# Patient Record
(demographics unavailable — no encounter records)

---

## 2018-03-17 NOTE — EKG
Test Reason : 

Blood Pressure : ***/*** mmHG

Vent. Rate : 077 BPM     Atrial Rate : 077 BPM

   P-R Int : 148 ms          QRS Dur : 076 ms

    QT Int : 408 ms       P-R-T Axes : 065 046 055 degrees

   QTc Int : 461 ms

 

Normal sinus rhythm

Possible Left atrial enlargement

Borderline ECG

 

Confirmed by LISBET VICENTE, MYRA JENKINS ([id number]),  CHEYANNE ANDREA ([id number]) on 3/17/2018 11:44:04 AM

 

Referred By:             Confirmed By:MYRA FRAUSTO MD

## 2018-06-04 NOTE — RAD
TWO VIEWS LEFT HIP:

 

HISTORY: 

Left hip pain.  A cow knocked me down on Saturday with left hip pain.

 

FINDINGS: 

AP and frogleg views of left hip are obtained on 6/4/18.  Comparison is made to previous exam from 1/
11/12.

 

Two views left hip demonstrate no evidence of left hip fractures, subluxations, or bony lesions.

 

IMPRESSION: 

Normal 2 views left hip.

 

POS: [facility]

## 2018-06-06 NOTE — CT
CT OF THE LEFT HIP

6/6/18

 

As the patient has a metallic hip arthroplasty in place, there is a large amount of artifact that is 
present which significantly degrades the images, particularly around the areas of interest. Axial sli
eric were acquired, and coronal and sagittal reconstructions were done. 

 

While there is no major fracture, on coronal image #16 there is an equivocal line at the junction of 
the left superior pubic ramus with the acetabulum. A hairline fracture here is possible.  I cannot co
nfirm it on all views due to the artifact and osteoporosis. Elsewhere, large gross fractures were not
 seen. There does not appear to be any excessive space around the stem of the prosthesis. 

 

IMPRESSION:  

Equivocal line at the junction of the left superior pubic ramus and acetabulum. I cannot exclude a sm
all hairline fracture here. 

 

 

 

POS: HOME

## 2018-06-06 NOTE — HP
DATE OF ADMISSION:  06/06/2018

 

CHIEF COMPLAINT:  Pain with inability to ambulate.

 

HISTORY OF PRESENT ILLNESS:  An 83-year-old female presented to the [facility] Emergency Department via EMS earlier today with complaints of pain and 
inability to perform weightbearing activity. The patient states she was bumped 
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 She has had prior 
hip replacements. She was evaluated at [facility] 
Emergency Department on 06/04/2018 with a left hip x-ray showing normal two 
views.  She was subsequently taken home by her cousin who lives locally as 
well. The patient typically lives alone and is able to perform her home 
activities of daily living.  She does have gait instability and normally uses a 
cane indoors and a walker outdoors. She presented today with complaints of 
decreased ability to perform weightbearing activities along with increased  
localized pain to her left hip. Subsequent imaging revealed suspicion for left 
superior ramus fracture.  She is unable to return home at this time secondary 
to her inability to ambulate and pain and thus she will be admitted for PT/OT, 
and pain control. While in the emergency department, she was mildly hypoxic, 
which was felt to be possibly secondary to receiving morphine. She has no 
baseline respiratory problems and is not on supplemental oxygen as an 
outpatient. She reports intake to be at baseline, but does have some issues 
with constipation for which she takes MiraLax at home on a p.r.n. basis. She is 
comfortable at this time and well aware of her reason for admission.

 

PAST MEDICAL HISTORY:  Gastroesophageal reflux disease, hypertension, uterine 
cancer, osteoarthritis, hypertension.

 

PAST SURGICAL HISTORY:  Abdominal hysterectomy, and bilateral hip replacement.

 

SOCIAL HISTORY:  Patient lives alone.  She has a cousin who lives locally and 
her sister lives at [facility].  She is a nonsmoker with no 
ETOH or illicit drug use.

 

ALLERGIES:  No known drug allergies.

 

FAMILY HISTORY:  Noncontributory.

 

CURRENT MEDICATIONS:  Include aspirin 81 mg p.o. daily, bisoprolol/
hydrochlorothiazide 5/6.25 mg p.o. daily, amlodipine 5 mg p.o. daily.

 

REVIEW OF SYSTEMS:  General:  Denies fever, chills or diaphoresis.  Ear, Nose, 
and Throat:  Denies sore throat, nasal drainage or congestion.  Cardiovascular:
  Denies chest pain, palpitations.  Respiratory:  Denies shortness of breath or 
cough.  Gastrointestinal:  Denies abdominal pain, nausea, vomiting or diarrhea.
  Does complain of some constipation.  Genitourinary:  Denies dysuria or 
hematuria.  Musculoskeletal:  Complains of left hip pain.  Dermatologic:  
Denies rash.  Neurologic:  Denies headache.

 

LABORATORY DATA:  CBC shows white blood cell count 7.0, H&H is 11.0 and 32.2, 
platelets are 204.  CMP shows normal electrolytes, BUN of 23, creatinine is 0.82
, and a GFR 67, glucose is 112.  Lactic acid is 1.0.  LFTs are normal.

 

PHYSICAL EXAMINATION:

VITAL SIGNS:  Temperature is 98.0, pulse is 77, respiratory rate is 20, oxygen 
93% on 2 liters, blood pressures 174/77.

GENERAL:  The patient is alert and oriented.  She is in no acute distress.

FACE:  No asymmetry.

EYES:  Conjunctivae are clear.  Extraocular muscles are intact bilaterally.  No 
discharge.

HEENT:  Within normal limits.  Oral cavity, moist mucous membranes.  She is 
missing most of her teeth.

NECK:  Supple, full range of motion.  No meningeal signs.

CARDIOVASCULAR:  Regular rate and rhythm, normal S1, S2.  No murmurs, rubs or 
gallops.

RESPIRATORY:  Nasal cannula is in place.  Clear to auscultation bilaterally 
without wheezes, rales or rhonchi.

ABDOMEN:  Soft, nontender to palpation, no masses.

EXTREMITIES:  Leg length discrepancy, right leg is shorter than the left.  She 
has osteoarthritic changes to bilateral hands.  She has discolored thickened 
toenails.  She has a left peripheral IV access.

SKIN:  Ecchymosis posterior lateral left knee.

NEUROLOGIC:  Nonfocal.  Cranial nerves II-XII are grossly intact.

MUSCULOSKELETAL:  There is tenderness to palpation into the left buttock.

 

ASSESSMENT AND PLAN:

1.  Left superior ramus fracture: nonsurgical diagnosis, the patient is unable 
to bear weight successfully at this time.  Thus, we will proceed with PT/OT and 
pain control.  I have discussed with the patient her options including 
potential placement to a long-term care facility.  She is undecided in regards 
to this at this time and would like to see how she progresses physically going 
forward; would like a  consult regarding this.

2.  Gait instability.  The patient typically ambulates with a cane or her 
walker.  She will work with therapy to try to regain her ability to ambulate 
successfully.

3.  Hypertension.  Patient's blood pressure is currently elevated.  We will 
continue her home blood pressure medications and monitor this to see if these 
need to be increased.  She is hemodynamically stable otherwise.

4.  Constipation, have ordered daily MiraLax and Milk of Magnesia p.r.n.

5.  Hypoxia.  The patient is currently on supplemental oxygen, which may be 
related to receiving narcotics.  We will work to wean the pt back to room air.

6.  Prophylaxis.  We will provide the patient famotidine for gastrointestinal 
prophylaxis but withhold anticoagulants secondary to her fall risk at this 
time. She is on aspirin.

 

CODE STATUS:  Patient is a FULL CODE.

 

MTDD

## 2018-06-06 NOTE — RAD
PELVIS ONE VIEW

6/6/18

 

AP views are provided. No definite fracture was appreciated, through the patient is osteopenia could 
easily mask subtle injuries. The pubic rings appeared basically intact. Bilateral hip arthroplasties 
are present. The SI joints showed no acute change. The symphysis shows no widening or offset. Severe 
degenerative change is seen in the lower lumbar spine. 

 

IMPRESSION:  

No acute findings. See CT report to follow.

 

POS: HOME

## 2018-06-06 NOTE — RAD
PORTABLE CHEST:

6/6/18

 

Comparison is made with a 12/10/14 study.

 

The heart is normal in size. The lungs are clear with no infiltrate, effusion, or congestion. A calci
fied granuloma is seen in the right mid lung zone and has not changed in size or appearance in the in
terval. The mediastinum appears normal and the trachea is midline. 

 

IMPRESSION:  

No acute thoracic finding. 

 

POS: HOME

## 2018-06-09 NOTE — DIS
DATE OF ADMISSION:  06/06/2018

 

DATE OF DISCHARGE:  06/09/2018

 

ADMISSION DIAGNOSES:

1.  Left superior ramus fracture.

2.  Gait instability.

3.  Hypertension.

4.  Constipation.

5.  Hypoxia.

 

DISCHARGE DIAGNOSES:

1.  Left superior ramus fracture.

2.  Gait instability.

3.  Hypertension.

4.  Constipation.

5.  Hypoxia, resolved.

 

ATTENDING PHYSICIAN:  Dr. Sanford Brar with Dr. Priyanka Ruelas covering on the date of discharge.

 

PROCEDURES:

1.  Lower extremity CT scan from the date of admission showing equivocal line at the junction of the 
left superior pubic ramus and acetabulum and cannot exclude a small hairline fracture.

2.  Pelvis x-ray from the date of admission showing no acute findings.

3.  Chest x-ray from the date of admission showing no acute thoracic findings.

 

HISTORY AND PHYSICAL:  Please see dictated report from the date of admission.

 

HOSPITAL COURSE:  Ms. Stewart is an 83-year-old  female with history of hypertension, gastroe
sophageal reflux disease and osteoarthritis, who was evaluated through the ER for complaint of persis
tent left hip pain status post recent fall.  A CT scan confirmed a suspected hairline fracture at the
 left superior pubic ramus.  Due to the fact that she has been unable to ambulate, she was admitted f
or pain control, PT and OT.  Her hospital course has been complicated by intermittent constipation, w
hich resolved with p.r.n. Milk of Magnesia.  She is also noted to have some hypoxia on admission, but
 was able to wean to room air by the date of her discharge.  She had negative chest imaging.

 

She has a history of hypertension and her systolic blood pressures range from the 120s to the 150s wh
ile hospitalized, deemed slightly elevated likely due to patient pain.  She was continued on her curr
ent regimen and her medications will continue to be adjusted accordingly and blood pressure monitored
 in her swing bed unit.

 

DISPOSITION:  Transfer to skilled nursing at [facility] for continued PT, OT and pain contro
l.

 

CODE STATUS:  FULL RESUSCITATION.

 

CONDITION:  Good.

 

MEDICATIONS:

1.  Acetaminophen 1000 mg p.o. q.6 hours p.r.n.

2.  Amlodipine 5 mg p.o. q. day.

3.  Aspirin 81 mg p.o. q. day.

4.  Bisoprolol 2.5 mg p.o. q. day.

5.  Calcium carbonate 500 mg p.o. q. day.

6.  Famotidine 20 mg p.o. b.i.d.

7.  Hydrochlorothiazide 6.25 mg p.o. daily.

8.  Hydrocodone 5/325 one p.o. q.4 hours p.r.n. mild to moderate pain.

9.  Hydrocodone 10/325 one p.o. q.4 hours p.r.n. severe pain.

10.  Claritin 10 mg p.o. q. day.

11.  Milk of Magnesia 30 mL p.o. q. day p.r.n. constipation.

12.  Zofran 4 mg sublingual q.6 hours p.r.n. nausea and vomiting.

13.  Polyethylene glycol 17 grams p.o. q. day.

14.  Zocor 10 mg p.o. q.p.m.

 

FOLLOWUP:  Follow up per swing bed disposition.

## 2018-06-16 NOTE — DIS
DATE OF ADMISSION:  06/06/2018

 

DATE OF DISCHARGE:  06/15/2018

 

ADMISSION DIAGNOSES:  Left superior ramus fracture, gait instability, hypoxia.

 

SECONDARY DIAGNOSES:  Hypertension and constipation.

 

PROCEDURE:  The x-ray of the left hip showed suspicion for a left superior 
ramus fracture.

 

HOSPITAL COURSE:  An 83-year-old female initially presented to [facility] 
Emergency Department with increasing pain to the left hip with inability to 
perform weightbearing activity.  Prior to evaluation here, she had been bumped 
by one of her cows, falling on her left side, which prompted her to seek 
evaluation at [facility] where a left hip x-ray 
showed a normal 2-view.  She was taken home by her cousin, who lives locally, 
but had gradually worsening pain and gait instability; she typically uses a 
cane or walker; however, was unable to effectively ambulate.  Re-evaluation of 
her imaging showed suspicion for the left superior ramus fracture.  She 
received pain medication in the emergency department and was notably hypoxic as 
well which was felt to be secondary to opioid treatment.  



She was admitted for pain control, hypoxia and inability to ambulate.  While 
here, she participated with PT and OT and improved her ability to ambulate with 
the assistance of her walker.  She was successfully weaned off of supplemental 
oxygen.  The patient lives alone, and although she has improved enough to 
potentially be able to return to her home setting with her improvement with PT/
OT, on discussion with her cousin, who lives locally, has revealed that the 
patient has an unsafe living situation including lack of running water, no air 
conditioning, and holes in her floor.  Thus, the patient has agreed to 
transition to [facility] for further care at this time.  
The patient is amenable to this plan of care.  As an aside, she did have 
constipation during her stay, which was effectively treated.

 

DISPOSITION:  The patient was discharged to [facility] 
for further care.

 

DISCHARGE MEDICATIONS:  She will resume her usual medications including aspirin 
81 mg p.o. daily, bisoprolol/hydrochlorothiazide 5/6.25 mg p.o. daily, 
amlodipine 5 mg p.o. daily.  She will be provided tramadol 50 mg q.8 hours 
p.r.n. for further pain control.

 

[facility] Center[facility]IAH

## 2019-10-31 NOTE — CT
CT OF THE BRAIN WITHOUT CONTRAST:

10/31/19

 

Diffuse atrophy is present with patchy hypolucency consistent with chronic microvascular ischemia. Th
ere may have been an old stroke in the left parietal region. No intracranial bleeding or extra-axial 
hematoma is seen. There is compensatory dilatation of the ventricles which is consistent with the atr
ophy present. The calvarium appears intact. The paranasal sinuses are clear. The mastoid air cells ar
e clear. 

 

IMPRESSION: 

Atrophy and chronic ischemic changes but no acute intracranial finding. 

 

POS: HOME